# Patient Record
Sex: FEMALE | Race: OTHER | URBAN - METROPOLITAN AREA
[De-identification: names, ages, dates, MRNs, and addresses within clinical notes are randomized per-mention and may not be internally consistent; named-entity substitution may affect disease eponyms.]

---

## 2017-04-17 ENCOUNTER — OUTPATIENT (OUTPATIENT)
Dept: OUTPATIENT SERVICES | Facility: HOSPITAL | Age: 57
LOS: 1 days | Discharge: HOME | End: 2017-04-17

## 2017-05-02 ENCOUNTER — APPOINTMENT (OUTPATIENT)
Dept: BREAST CENTER | Facility: CLINIC | Age: 57
End: 2017-05-02

## 2017-05-02 VITALS
WEIGHT: 194 LBS | HEIGHT: 59 IN | BODY MASS INDEX: 39.11 KG/M2 | DIASTOLIC BLOOD PRESSURE: 78 MMHG | SYSTOLIC BLOOD PRESSURE: 134 MMHG

## 2017-05-02 DIAGNOSIS — K21.9 GASTRO-ESOPHAGEAL REFLUX DISEASE W/OUT ESOPHAGITIS: ICD-10-CM

## 2017-06-27 DIAGNOSIS — Z12.31 ENCOUNTER FOR SCREENING MAMMOGRAM FOR MALIGNANT NEOPLASM OF BREAST: ICD-10-CM

## 2017-10-04 ENCOUNTER — EMERGENCY (EMERGENCY)
Facility: HOSPITAL | Age: 57
LOS: 0 days | Discharge: HOME | End: 2017-10-04

## 2017-10-04 DIAGNOSIS — M25.561 PAIN IN RIGHT KNEE: ICD-10-CM

## 2017-12-18 ENCOUNTER — APPOINTMENT (OUTPATIENT)
Dept: HEMATOLOGY ONCOLOGY | Facility: CLINIC | Age: 57
End: 2017-12-18

## 2017-12-18 ENCOUNTER — OUTPATIENT (OUTPATIENT)
Dept: OUTPATIENT SERVICES | Facility: HOSPITAL | Age: 57
LOS: 1 days | Discharge: HOME | End: 2017-12-18

## 2017-12-18 VITALS
TEMPERATURE: 98.2 F | BODY MASS INDEX: 38.71 KG/M2 | DIASTOLIC BLOOD PRESSURE: 74 MMHG | HEART RATE: 60 BPM | HEIGHT: 59 IN | WEIGHT: 192 LBS | RESPIRATION RATE: 14 BRPM | SYSTOLIC BLOOD PRESSURE: 142 MMHG

## 2017-12-19 DIAGNOSIS — Z85.3 PERSONAL HISTORY OF MALIGNANT NEOPLASM OF BREAST: ICD-10-CM

## 2018-04-23 ENCOUNTER — OUTPATIENT (OUTPATIENT)
Dept: OUTPATIENT SERVICES | Facility: HOSPITAL | Age: 58
LOS: 1 days | Discharge: HOME | End: 2018-04-23

## 2018-04-23 DIAGNOSIS — Z12.31 ENCOUNTER FOR SCREENING MAMMOGRAM FOR MALIGNANT NEOPLASM OF BREAST: ICD-10-CM

## 2018-05-01 ENCOUNTER — APPOINTMENT (OUTPATIENT)
Dept: BREAST CENTER | Facility: CLINIC | Age: 58
End: 2018-05-01
Payer: MEDICAID

## 2018-05-01 VITALS
WEIGHT: 190 LBS | BODY MASS INDEX: 38.3 KG/M2 | HEIGHT: 59 IN | HEART RATE: 70 BPM | DIASTOLIC BLOOD PRESSURE: 90 MMHG | OXYGEN SATURATION: 98 % | SYSTOLIC BLOOD PRESSURE: 136 MMHG

## 2018-05-01 PROCEDURE — 99213 OFFICE O/P EST LOW 20 MIN: CPT

## 2018-06-16 ENCOUNTER — EMERGENCY (EMERGENCY)
Facility: HOSPITAL | Age: 58
LOS: 0 days | Discharge: HOME | End: 2018-06-17
Admitting: PHYSICIAN ASSISTANT

## 2018-06-16 VITALS
TEMPERATURE: 97 F | OXYGEN SATURATION: 97 % | SYSTOLIC BLOOD PRESSURE: 184 MMHG | DIASTOLIC BLOOD PRESSURE: 77 MMHG | HEART RATE: 63 BPM | RESPIRATION RATE: 20 BRPM

## 2018-06-16 DIAGNOSIS — S61.217A LACERATION WITHOUT FOREIGN BODY OF LEFT LITTLE FINGER WITHOUT DAMAGE TO NAIL, INITIAL ENCOUNTER: ICD-10-CM

## 2018-06-16 DIAGNOSIS — Z79.899 OTHER LONG TERM (CURRENT) DRUG THERAPY: ICD-10-CM
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
## 2018-06-16 DIAGNOSIS — I10 ESSENTIAL (PRIMARY) HYPERTENSION: ICD-10-CM
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
## 2018-06-16 DIAGNOSIS — Z79.82 LONG TERM (CURRENT) USE OF ASPIRIN: ICD-10-CM
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
## 2018-06-16 DIAGNOSIS — Z23 ENCOUNTER FOR IMMUNIZATION: ICD-10-CM

## 2018-06-16 RX ORDER — TETANUS TOXOID, REDUCED DIPHTHERIA TOXOID AND ACELLULAR PERTUSSIS VACCINE, ADSORBED 5; 2.5; 8; 8; 2.5 [IU]/.5ML; [IU]/.5ML; UG/.5ML; UG/.5ML; UG/.5ML
0.5 SUSPENSION INTRAMUSCULAR ONCE
Qty: 0 | Refills: 0 | Status: COMPLETED | OUTPATIENT
Start: 2018-06-16 | End: 2018-06-16

## 2018-06-16 NOTE — ED ADULT NURSE NOTE - OBJECTIVE STATEMENT
Patient reports cutting her finger with knife while making dinner resulting in a laceration to the dorsal aspect of left fifth finger.

## 2018-06-17 RX ORDER — LOSARTAN POTASSIUM 100 MG/1
0 TABLET, FILM COATED ORAL
Qty: 0 | Refills: 0 | COMMUNITY

## 2018-06-17 RX ORDER — ASPIRIN/CALCIUM CARB/MAGNESIUM 324 MG
0 TABLET ORAL
Qty: 0 | Refills: 0 | COMMUNITY

## 2018-06-17 RX ADMIN — TETANUS TOXOID, REDUCED DIPHTHERIA TOXOID AND ACELLULAR PERTUSSIS VACCINE, ADSORBED 0.5 MILLILITER(S): 5; 2.5; 8; 8; 2.5 SUSPENSION INTRAMUSCULAR at 00:09

## 2018-06-17 NOTE — ED PROVIDER NOTE - NS ED ROS FT
CONSTITUTIONAL: No weakness, fevers, or chills  RESPIRATORY:  No shortness of breath  CARDIOVASCULAR: No chest pain or palpitations  GASTROINTESTINAL: No nausea or vomiting  NEUROLOGICAL: No numbness or weakness or tingling to extremities. No dizziness.  SKIN: + laceration to left pinky

## 2018-06-17 NOTE — ED PROVIDER NOTE - DIAGNOSIS COUNSELING, MDM
conducted a detailed discussion... Patient to be discharged from ED. Verbal instructions given, including instructions to return to ED immediately for any new, worsening, or concerning symptoms. Patient endorsed understanding. Written discharge instructions additionally given, including follow-up plan.

## 2018-06-17 NOTE — ED PROVIDER NOTE - PHYSICAL EXAMINATION
GENERAL: NAD, well-developed  HEAD:  Atraumatic, Normocephalic  CHEST/LUNG: Clear to auscultation bilaterally; No wheeze, rhonchi, or rales  HEART: Regular rate and rhythm; No murmurs, rubs, or gallops  EXTREMITIES:  Radial pulses present B/L.  PSYCH: AAOx3, cooperative, appropriate  NEUROLOGY: AAOx3,  Strength 5/5 throughout. Sensation intact  MSK: Good tendon strength to phalanges of left hand.  SKIN: + 2 cm laceration at base of 5th MCP joint on dorsal side.  Minimal active bleeding. No signs of infection or FB. No tendon involvement

## 2018-06-17 NOTE — ED PROVIDER NOTE - OBJECTIVE STATEMENT
58 yo female with PMH of HTN presents to the Ed s/p laceration to left pinky caused by a knife while trying to chop ice x 1 hours ago. Patient denies fever, chills, other trauma, or numbness/tingling/weakness to extremities. Patient is unsure when last tetanus was received.

## 2018-12-17 ENCOUNTER — APPOINTMENT (OUTPATIENT)
Dept: HEMATOLOGY ONCOLOGY | Facility: CLINIC | Age: 58
End: 2018-12-17

## 2018-12-17 ENCOUNTER — OUTPATIENT (OUTPATIENT)
Dept: OUTPATIENT SERVICES | Facility: HOSPITAL | Age: 58
LOS: 1 days | Discharge: HOME | End: 2018-12-17

## 2018-12-17 VITALS
RESPIRATION RATE: 16 BRPM | TEMPERATURE: 96.6 F | HEIGHT: 59 IN | HEART RATE: 54 BPM | SYSTOLIC BLOOD PRESSURE: 136 MMHG | WEIGHT: 194 LBS | BODY MASS INDEX: 39.11 KG/M2 | DIASTOLIC BLOOD PRESSURE: 62 MMHG

## 2018-12-17 PROBLEM — I10 ESSENTIAL (PRIMARY) HYPERTENSION: Chronic | Status: ACTIVE | Noted: 2018-06-16

## 2018-12-18 DIAGNOSIS — Z85.3 PERSONAL HISTORY OF MALIGNANT NEOPLASM OF BREAST: ICD-10-CM

## 2018-12-21 NOTE — HISTORY OF PRESENT ILLNESS
[de-identified] : The patient returns for a followup visit.  The patient has a history of stage IA infiltrating ductal carcinoma of the right breast, status post lumpectomy, pT1a N0 M0, with extensive intraductal component.  The tumor was ER/[place] positive and HER2 negative.  She was treated with right breast lumpectomy and adjuvant radiation therapy, and since then, she has been on Arimidex.  She was started on Arimidex in 09/2011.  She denies any new symptoms.  She had a mammogram ithis year as noted below. She had DEXA Scan in 08/2016, which showed some osteopenia. \par Denies any new complaints. [de-identified] : Stage 1A Breast cancer [de-identified] : 12/17/18:\par Doing well. No major complaints.\par Denies fever, nausea, vomiting, chest pain, SOB, abdominal pain, bowel and bladder problems.\par Uptodate with mammo.

## 2018-12-21 NOTE — PHYSICAL EXAM
[Fully active, able to carry on all pre-disease performance without restriction] : Status 0 - Fully active, able to carry on all pre-disease performance without restriction [Normal] : grossly intact [Ulcers] : no ulcers [Mucositis] : no mucositis [Thrush] : no thrush [Vesicles] : no vesicles

## 2018-12-21 NOTE — ASSESSMENT
[FreeTextEntry1] : She has stage IA infiltrating ductal carcinoma of the right breast, ER/UT positive and HER2 negative, status post lumpectomy and adjuvant radiation therapy, and has been on hormonal therapy. \par Patient started on Hormonal therapy in 09/2011, and has completed 5 year of Adjuvant therapy.\par \par - Uptodate with mammo. Last one in 04/2018. Screening mammo prescription given.\par \par RTC in 1 year.\par Pt seen and examined with \par \par \par

## 2018-12-21 NOTE — RESULTS/DATA
[FreeTextEntry1] : EXAM:  MG MAMMO SCREEN BI         PROCEDURE DATE:  04/23/2018      INTERPRETATION:  HISTORY: Patient is 57 years old and is seen for screening.   The patient has a  history of right lumpectomy in 2012 - malignant.   The patient has the  following family history of breast cancer:  maternal aunt, at age 55,  breast cancer.  CLINICAL BREAST EXAM: The patient reports her last clinical breast exam was performed 11 months  ago.  FILMS COMPARED: The present examination has been compared to prior imaging studies  performed at [facility] on 04/08/2015, 04/11/2016  and 04/17/2017.  MAMMOGRAM FINDINGS: The following mammographic views were obtained: bilateral craniocaudal,  bilateral mediolateral oblique.  Computer-aided detection was utilized in  the interpretation of this examination.  There are scattered areas of fibroglandular density.  There is a stable area of architectural distortion at the site of  lumpectomy, consistent with postoperative fibrosis seen in the right  breast.  No suspicious masses, calcifications or other abnormalities are seen.  IMPRESSION: There is no evidence of malignancy.  RECOMMENDATION: Unless otherwise indicated by clinical findings, annual screening  mammography recommended.  ASSESSMENT: BI-RADS Category 2:  Benign

## 2018-12-21 NOTE — CONSULT LETTER
[Dear  ___] : Dear  [unfilled], [Courtesy Letter:] : I had the pleasure of seeing your patient, [unfilled], in my office today. [Please see my note below.] : Please see my note below. [Sincerely,] : Sincerely, [FreeTextEntry3] : Dr Gonzalez

## 2019-02-25 ENCOUNTER — OUTPATIENT (OUTPATIENT)
Dept: OUTPATIENT SERVICES | Facility: HOSPITAL | Age: 59
LOS: 1 days | Discharge: HOME | End: 2019-02-25

## 2019-02-25 DIAGNOSIS — Z13.220 ENCOUNTER FOR SCREENING FOR LIPOID DISORDERS: ICD-10-CM

## 2019-02-25 DIAGNOSIS — R79.89 OTHER SPECIFIED ABNORMAL FINDINGS OF BLOOD CHEMISTRY: ICD-10-CM

## 2019-02-25 DIAGNOSIS — R73.09 OTHER ABNORMAL GLUCOSE: ICD-10-CM

## 2019-02-25 DIAGNOSIS — Z00.00 ENCOUNTER FOR GENERAL ADULT MEDICAL EXAMINATION WITHOUT ABNORMAL FINDINGS: ICD-10-CM

## 2019-02-25 DIAGNOSIS — Z13.21 ENCOUNTER FOR SCREENING FOR NUTRITIONAL DISORDER: ICD-10-CM

## 2019-02-25 DIAGNOSIS — E11.9 TYPE 2 DIABETES MELLITUS WITHOUT COMPLICATIONS: ICD-10-CM

## 2019-02-25 DIAGNOSIS — N39.0 URINARY TRACT INFECTION, SITE NOT SPECIFIED: ICD-10-CM

## 2019-02-25 DIAGNOSIS — R80.9 PROTEINURIA, UNSPECIFIED: ICD-10-CM

## 2019-02-25 DIAGNOSIS — R79.1 ABNORMAL COAGULATION PROFILE: ICD-10-CM

## 2019-02-25 DIAGNOSIS — R97.0 ELEVATED CARCINOEMBRYONIC ANTIGEN [CEA]: ICD-10-CM

## 2019-02-25 DIAGNOSIS — R94.6 ABNORMAL RESULTS OF THYROID FUNCTION STUDIES: ICD-10-CM

## 2019-02-25 DIAGNOSIS — Z13.0 ENCOUNTER FOR SCREENING FOR DISEASES OF THE BLOOD AND BLOOD-FORMING ORGANS AND CERTAIN DISORDERS INVOLVING THE IMMUNE MECHANISM: ICD-10-CM

## 2019-04-28 ENCOUNTER — FORM ENCOUNTER (OUTPATIENT)
Age: 59
End: 2019-04-28

## 2019-04-29 ENCOUNTER — OUTPATIENT (OUTPATIENT)
Dept: OUTPATIENT SERVICES | Facility: HOSPITAL | Age: 59
LOS: 1 days | Discharge: HOME | End: 2019-04-29
Payer: MEDICAID

## 2019-04-29 DIAGNOSIS — Z12.31 ENCOUNTER FOR SCREENING MAMMOGRAM FOR MALIGNANT NEOPLASM OF BREAST: ICD-10-CM

## 2019-04-29 PROCEDURE — 77067 SCR MAMMO BI INCL CAD: CPT | Mod: 26

## 2019-05-07 ENCOUNTER — APPOINTMENT (OUTPATIENT)
Dept: BREAST CENTER | Facility: CLINIC | Age: 59
End: 2019-05-07
Payer: MEDICAID

## 2019-05-07 VITALS
BODY MASS INDEX: 39.11 KG/M2 | HEIGHT: 59 IN | DIASTOLIC BLOOD PRESSURE: 76 MMHG | SYSTOLIC BLOOD PRESSURE: 124 MMHG | WEIGHT: 194 LBS | TEMPERATURE: 98.6 F

## 2019-05-07 PROCEDURE — 99213 OFFICE O/P EST LOW 20 MIN: CPT

## 2019-05-08 NOTE — HISTORY OF PRESENT ILLNESS
[FreeTextEntry1] : Problem List:\par 1) Stage IA Right breast cancer. ER/RI (+). HER2 (-). (March 2011)\par 2) s/p Right NLOC - 04/20/11. s/p Right SNB - 06/06/11.\par 3) Dr. Gonzalez - Arimidex x 5 years. No chemo.\par 4) Dr. Quintero (+) RT.\par \par JUSTIN CEVALLOS is a 58 year old female patient who presents today in follow up for history of \par right breast cancer.\par Since her last visit, she has no new breast related complaints. \par Imaging was done on 04/29/19, which revealed no mammographic evidence of malignancy.\par \par She presents today for evaluation and imaging review.

## 2019-05-08 NOTE — REVIEW OF SYSTEMS
[Negative] : Constitutional [Nipple Discharge] : no nipple discharge [Breast Pain] : no breast pain [Breast Lump] : no breast lump [Nipple Inverted] : no inversion of the nipple

## 2019-05-08 NOTE — ASSESSMENT
[FreeTextEntry1] : JUSTIN is a rubi 58 year old patient who presented today in follow up for a history of right breast cancer. \par She has been doing well with no new breast related complaints. \par Imaging was done recently which revealed no evidence of malignancy, as detailed above. \par Physical exam was unrevealing today.\par \par Imaging with a bilateral screening mammogram will be due in April 2020, and that will be scheduled today. \par All questions answered. She knows to call with any concerns. \par She will return for follow-up and clinical breast exam in April 2020.\par She will continue follow-up with medical oncology as well. \par \par JUSTIN CEVALLOS has been enrolled in the breast cancer surgical survivorship care program.\par A copy of her survivorship care plan has been provided to her as well.

## 2019-05-08 NOTE — PHYSICAL EXAM
[Normocephalic] : normocephalic [Atraumatic] : atraumatic [No dominant masses] : no dominant masses in right breast  [No dominant masses] : no dominant masses left breast [No Nipple Discharge] : no left nipple discharge [Breast Mass Right Breast ___cm] : no masses [Breast Mass Left Breast ___cm] : no masses [No Rashes] : no rashes [No Ulceration] : no ulceration [Breast Nipple Inversion] : nipples not inverted [Breast Nipple Retraction] : nipples not retracted [de-identified] : well healed surgical scar.\par lumpectomy defect.  [de-identified] : No axillary lymphadenopathy appreciated. [de-identified] : No axillary lymphadenopathy appreciated.

## 2019-05-08 NOTE — PAST MEDICAL HISTORY
[Postmenopausal] : The patient is postmenopausal [Menarche Age ____] : age at menarche was [unfilled] [Total Preg ___] : G[unfilled] [Live Births ___] : P[unfilled]  [Age At Live Birth ___] : Age at live birth: [unfilled] [FreeTextEntry8] : Yes. [FreeTextEntry7] : Yes.

## 2019-05-08 NOTE — REASON FOR VISIT
[Follow-Up: _____] : a [unfilled] follow-up visit [FreeTextEntry1] : history of right breast cancer; imaging review.

## 2019-05-08 NOTE — DATA REVIEWED
[FreeTextEntry1] : B/L Screening Mammo - 04/29/19:\par MAMMOGRAM FINDINGS: \par Mammography was performed including the following views: bilateral \par craniocaudal with tomosynthesis, bilateral mediolateral oblique with \par tomosynthesis, bilateral craniocaudal, bilateral mediolateral oblique. The \par examination includes digital synthetic 2D and digital tomosynthesis 3D \par images. Additional imaging analysis was performed using CAD (computer-aided \par detection) software. \par There are scattered areas of fibroglandular density. \par Finding 1: There is a stable oval mass seen in the upper outer quadrant of \par the right breast. \par Finding 2: There are coarse popcorn-like calcifications seen in the right \par breast. \par This finding is most consistent with a calcified fibroadenoma. \par No suspicious mass, grouping of calcifications, or other abnormality is \par identified. \par There has been no significant interval change from prior studies. \par IMPRESSION: \par There is no mammographic evidence of malignancy. \par RECOMMENDATION: \par Unless otherwise indicated by clinical findings, annual screening \par mammography recommended. \par ASSESSMENT: \par BI-RADS Category 2: Benign

## 2019-12-16 ENCOUNTER — OUTPATIENT (OUTPATIENT)
Dept: OUTPATIENT SERVICES | Facility: HOSPITAL | Age: 59
LOS: 1 days | Discharge: HOME | End: 2019-12-16

## 2019-12-16 ENCOUNTER — APPOINTMENT (OUTPATIENT)
Dept: HEMATOLOGY ONCOLOGY | Facility: CLINIC | Age: 59
End: 2019-12-16
Payer: MEDICAID

## 2019-12-16 VITALS
HEIGHT: 59 IN | DIASTOLIC BLOOD PRESSURE: 72 MMHG | TEMPERATURE: 97.2 F | BODY MASS INDEX: 39.31 KG/M2 | WEIGHT: 195 LBS | HEART RATE: 57 BPM | SYSTOLIC BLOOD PRESSURE: 172 MMHG

## 2019-12-16 PROCEDURE — 99213 OFFICE O/P EST LOW 20 MIN: CPT

## 2019-12-16 NOTE — PHYSICAL EXAM
[Fully active, able to carry on all pre-disease performance without restriction] : Status 0 - Fully active, able to carry on all pre-disease performance without restriction [Normal] : grossly intact [Ulcers] : no ulcers [Thrush] : no thrush [Vesicles] : no vesicles [Mucositis] : no mucositis

## 2019-12-16 NOTE — HISTORY OF PRESENT ILLNESS
[de-identified] : The patient returns for a followup visit.  The patient has a history of stage IA infiltrating ductal carcinoma of the right breast, status post lumpectomy, pT1a N0 M0, with extensive intraductal component.  The tumor was ER/ND positive and HER2 negative.  She was treated with right breast lumpectomy and adjuvant radiation therapy, and since then, she has been on Arimidex.  She was started on Arimidex in 09/2011.  She denies any new symptoms.  She had a mammogram ithis year as noted below. She had DEXA Scan in 08/2016, which showed some osteopenia. \par Denies any new complaints. [de-identified] : Stage 1A Breast cancer [de-identified] : 12/17/18:\par Doing well. No major complaints.\par Denies fever, nausea, vomiting, chest pain, SOB, abdominal pain, bowel and bladder problems.\par Uptodate with mammo. \par \par 12/16/19\par  Patient here for follow up, feeling well.  She denies any new complaints.  Patient denies any new palpable breast lumps or pain, denies skin changes, denies nipple discharge.  Patient denies cough, shortness of breath, denies fever, denies bone pain.\par

## 2019-12-16 NOTE — ASSESSMENT
[FreeTextEntry1] : She has stage IA infiltrating ductal carcinoma of the right breast, ER/[place] positive and HER2 negative, status post lumpectomy and adjuvant radiation therapy, and has been on hormonal therapy. \par Patient started on Hormonal therapy in 09/2011, and has completed 5 year of Adjuvant therapy.\par \par - Uptodate with mammo. Last one in 04/2019. \par RTC in 1 year.\par \par

## 2019-12-16 NOTE — RESULTS/DATA
[FreeTextEntry1] : EXAM: MG MAMMO SCREEN BI \par PROCEDURE DATE: 04/29/2019 \par INTERPRETATION: HISTORY: \par Bilateral MG MAMMO SCREEN BI was performed. Patient is 58 years old and is seen for screening. The patient has a \par history of right lumpectomy in 2012 - malignant. The patient has the following family history of breast cancer: maternal \par aunt, at age 55, breast cancer. \par CLINICAL BREAST EXAM: \par The patient reports her last clinical breast exam was performed 7 months ago. \par COMPARISON STUDIES: \par The present examination has been compared to prior imaging studies performed at [facility] on \par 03/27/2014, 04/08/2015, 04/11/2016, 04/17/2017 and 04/23/2018. \par MAMMOGRAM FINDINGS: \par Mammography was performed including the following views: bilateral craniocaudal with tomosynthesis, bilateral \par mediolateral oblique with tomosynthesis, bilateral craniocaudal, bilateral mediolateral oblique. The examination includes \par digital synthetic 2D and digital tomosynthesis 3D images. Additional imaging analysis was performed using CAD \par (computer-aided detection) software. \par There are scattered areas of fibroglandular density. \par Finding 1: There is a stable oval mass seen in the upper outer quadrant of the right breast. \par Finding 2: There are coarse popcorn-like calcifications seen in the right breast. \par This finding is most consistent with a calcified fibroadenoma. \par No suspicious mass, grouping of calcifications, or other abnormality is identified. \par There has been no significant interval change from prior studies. \par IMPRESSION: \par There is no mammographic evidence of malignancy. \par RECOMMENDATION: \par Unless otherwise indicated by clinical findings, annual screening mammography recommended.

## 2019-12-23 DIAGNOSIS — C50.919 MALIGNANT NEOPLASM OF UNSPECIFIED SITE OF UNSPECIFIED FEMALE BREAST: ICD-10-CM

## 2020-05-12 ENCOUNTER — APPOINTMENT (OUTPATIENT)
Dept: BREAST CENTER | Facility: CLINIC | Age: 60
End: 2020-05-12

## 2020-07-15 ENCOUNTER — RESULT REVIEW (OUTPATIENT)
Age: 60
End: 2020-07-15

## 2020-07-15 ENCOUNTER — OUTPATIENT (OUTPATIENT)
Dept: OUTPATIENT SERVICES | Facility: HOSPITAL | Age: 60
LOS: 1 days | Discharge: HOME | End: 2020-07-15
Payer: COMMERCIAL

## 2020-07-15 DIAGNOSIS — Z12.31 ENCOUNTER FOR SCREENING MAMMOGRAM FOR MALIGNANT NEOPLASM OF BREAST: ICD-10-CM

## 2020-07-15 PROCEDURE — 77067 SCR MAMMO BI INCL CAD: CPT | Mod: 26

## 2020-07-15 PROCEDURE — 77063 BREAST TOMOSYNTHESIS BI: CPT | Mod: 26

## 2020-08-14 ENCOUNTER — APPOINTMENT (OUTPATIENT)
Dept: BREAST CENTER | Facility: CLINIC | Age: 60
End: 2020-08-14
Payer: COMMERCIAL

## 2020-08-14 VITALS
TEMPERATURE: 97.7 F | WEIGHT: 195 LBS | DIASTOLIC BLOOD PRESSURE: 80 MMHG | HEIGHT: 59 IN | BODY MASS INDEX: 39.31 KG/M2 | SYSTOLIC BLOOD PRESSURE: 136 MMHG

## 2020-08-14 PROCEDURE — 99213 OFFICE O/P EST LOW 20 MIN: CPT

## 2020-08-14 NOTE — PHYSICAL EXAM
[Normocephalic] : normocephalic [Atraumatic] : atraumatic [No dominant masses] : no dominant masses in right breast  [No dominant masses] : no dominant masses left breast [No Nipple Discharge] : no left nipple discharge [Breast Mass Right Breast ___cm] : no masses [Breast Mass Left Breast ___cm] : no masses [No Rashes] : no rashes [No Ulceration] : no ulceration [Breast Nipple Inversion] : nipples not inverted [de-identified] : well healed surgical scar.\par lumpectomy defect.  [Breast Nipple Retraction] : nipples not retracted [de-identified] : No axillary lymphadenopathy appreciated. [de-identified] : No axillary lymphadenopathy appreciated.

## 2020-08-14 NOTE — ASSESSMENT
[FreeTextEntry1] : JUSTIN is a rubi 59 year old patient who presented today in follow up for a history of right breast cancer. \par She has been doing well with no new breast related complaints. \par Imaging was done recently which revealed no evidence of malignancy, as detailed above. \par Physical exam was unrevealing today.\par \par Imaging with a bilateral screening mammogram will be due in July 2021, and that will be scheduled today. \par She will return for follow-up and clinical breast exam in July 2021.\par She will continue follow-up with medical oncology as well. \par \par JUSTIN CEVALLOS has been enrolled in the breast cancer surgical survivorship care program.\par A copy of her survivorship care plan has been provided to her as well.\par \par I spent a total of 15 minutes of face to face time with this patient, greater than 50% of which was spent in counseling and/or coordination of care.\par All of her questions were appropriately answered.\par She knows to call with any concerns.

## 2020-08-14 NOTE — HISTORY OF PRESENT ILLNESS
[FreeTextEntry1] : Problem List:\par 1) Stage IA Right breast cancer. ER/ND (+). HER2 (-). (March 2011)\par 2) s/p Right NLOC - 04/20/11. s/p Right SNB - 06/06/11.\par 3) Dr. Gonzalez - Arimidex x 5 years. No chemo.\par 4) Dr. Quintero (+) RT.\par \par JUSTIN CEVALLOS is a 59 year old female patient who presents today in follow up for history of \par right breast cancer.\par Since her last visit, she has no new breast related complaints. \par Imaging was done on 07/15/2020, which revealed no mammographic evidence of malignancy.\par \par She presents today for evaluation and imaging review.

## 2020-08-14 NOTE — DATA REVIEWED
[FreeTextEntry1] : B/L Screening Mammo -  07/15/2020:\par MAMMOGRAM FINDINGS:\par Mammography was performed including the following views: bilateral craniocaudal with tomosynthesis, bilateral mediolateral oblique with tomosynthesis.  The examination includes digital synthetic 2D and digital tomosynthesis 3D images. Additional imaging analysis was performed using CAD (computer-aided detection) software.\par There are scattered areas of fibroglandular density.\par Finding 1:  There is a stable oval mass seen in the right breast.\par Finding 2:  There are stable calcifications seen in both breasts.\par No suspicious mass, grouping of calcifications, or other abnormality is identified.\par IMPRESSION:\par There is no mammographic evidence of malignancy.\par RECOMMENDATION:\par Unless otherwise indicated by clinical findings, annual screening mammography recommended.\par ASSESSMENT:\par BI-RADS Category 2:  Benign\par

## 2020-08-14 NOTE — PAST MEDICAL HISTORY
[Postmenopausal] : The patient is postmenopausal [Menarche Age ____] : age at menarche was [unfilled] [Total Preg ___] : G[unfilled] [Live Births ___] : P[unfilled]  [Age At Live Birth ___] : Age at live birth: [unfilled] [FreeTextEntry7] : Yes. [FreeTextEntry8] : Yes.

## 2020-12-28 ENCOUNTER — OUTPATIENT (OUTPATIENT)
Dept: OUTPATIENT SERVICES | Facility: HOSPITAL | Age: 60
LOS: 1 days | Discharge: HOME | End: 2020-12-28

## 2020-12-28 ENCOUNTER — APPOINTMENT (OUTPATIENT)
Dept: HEMATOLOGY ONCOLOGY | Facility: CLINIC | Age: 60
End: 2020-12-28
Payer: MEDICAID

## 2020-12-28 VITALS
SYSTOLIC BLOOD PRESSURE: 166 MMHG | DIASTOLIC BLOOD PRESSURE: 68 MMHG | WEIGHT: 202 LBS | HEIGHT: 59 IN | BODY MASS INDEX: 40.72 KG/M2 | TEMPERATURE: 99.1 F | HEART RATE: 59 BPM

## 2020-12-28 PROCEDURE — 99213 OFFICE O/P EST LOW 20 MIN: CPT

## 2020-12-29 NOTE — RESULTS/DATA
[FreeTextEntry1] : \par  Mammogram Digital Screening, Bilat             Final\par EXAM:  MG MAMMO SCREEN W ELSIE BI#\par \par \par PROCEDURE DATE:  07/15/2020\par \par \par \par INTERPRETATION:  HISTORY:\par Bilateral MG MAMMO SCREEN W ELSIE BI# was performed. Patient is 59 years old and is seen for screening.  The patient has a history of right lumpectomy in 2012 - malignant. The patient has the following family history of breast cancer:  maternal aunt, at age 55, breast cancer.\par \par RISK ASSESSMENT:\par [place] Lifetime Risk: 7.8\par \par CLINICAL BREAST EXAM:\par The patient reports her last clinical breast exam was performed over one year ago.\par \par COMPARISON STUDIES:\par The present examination has been compared to prior imaging studies performed at [facility] on 04/11/2016, 04/17/2017, 04/23/2018 and 04/29/2019.\par \par MAMMOGRAM FINDINGS:\par Mammography was performed including the following views: bilateral craniocaudal with tomosynthesis, bilateral mediolateral oblique with tomosynthesis.  The examination includes digital synthetic 2D and digital tomosynthesis 3D images. Additional imaging analysis was performed using CAD (computer-aided detection) software.\par \par There are scattered areas of fibroglandular density.\par \par Finding 1:  There is a stable oval mass seen in the right breast.\par \par Finding 2:  There are stable calcifications seen in both breasts.\par \par No suspicious mass, grouping of calcifications, or other abnormality is identified.\par \par IMPRESSION:\par There is no mammographic evidence of malignancy.\par \par RECOMMENDATION:\par Unless otherwise indicated by clinical findings, annual screening mammography recommended.\par \par ASSESSMENT:\par BI-RADS Category 2:  Benign\par \par The patient will be notified of these results by telephone, and will also be mailed a written summary in layman's terms.\par \par

## 2020-12-29 NOTE — HISTORY OF PRESENT ILLNESS
[de-identified] : The patient returns for a followup visit.  The patient has a history of stage IA infiltrating ductal carcinoma of the right breast, status post lumpectomy, pT1a N0 M0, with extensive intraductal component.  The tumor was ER/NM positive and HER2 negative.  She was treated with right breast lumpectomy and adjuvant radiation therapy, and since then, she has been on Arimidex.  She was started on Arimidex in 09/2011.  She denies any new symptoms.  She had a mammogram ithis year as noted below. She had DEXA Scan in 08/2016, which showed some osteopenia. \par Denies any new complaints. [de-identified] : Stage 1A Breast cancer [de-identified] : 12/17/18:\par Doing well. No major complaints.\par Denies fever, nausea, vomiting, chest pain, SOB, abdominal pain, bowel and bladder problems.\par Uptodate with mammo. \par \par 12/16/19\par  Patient here for follow up, feeling well.  She denies any new complaints.  Patient denies any new palpable breast lumps or pain, denies skin changes, denies nipple discharge.  Patient denies cough, shortness of breath, denies fever, denies bone pain.\par \par 12/28/2020\par JUSTIN CEVALLOS a 60 year F is here today for annual follow up for breast cancer. Patient denies any new palpable breast lumps or pain, denies skin changes, denies nipple discharge. Offers no complaints. \par \par

## 2020-12-29 NOTE — ASSESSMENT
[FreeTextEntry1] : She has stage IA infiltrating ductal carcinoma of the right breast, ER/CT positive and HER2 negative, status post lumpectomy and adjuvant radiation therapy, and has been on hormonal therapy. \par Patient started on Hormonal therapy in 09/2011, and has completed 5 year of Adjuvant therapy.\par \par - Uptodate with mammo. Last one in 07/2020. \par \par \par RTC in 1 year.\par \par Case was seen and discussed with Dr. Gonzalez who agreed with the assessment and plan.\par

## 2021-01-04 DIAGNOSIS — C50.919 MALIGNANT NEOPLASM OF UNSPECIFIED SITE OF UNSPECIFIED FEMALE BREAST: ICD-10-CM

## 2021-02-19 ENCOUNTER — EMERGENCY (EMERGENCY)
Facility: HOSPITAL | Age: 61
LOS: 0 days | Discharge: HOME | End: 2021-02-19
Attending: EMERGENCY MEDICINE | Admitting: EMERGENCY MEDICINE
Payer: MEDICAID

## 2021-02-19 VITALS
TEMPERATURE: 208 F | OXYGEN SATURATION: 96 % | HEART RATE: 76 BPM | RESPIRATION RATE: 18 BRPM | DIASTOLIC BLOOD PRESSURE: 79 MMHG | SYSTOLIC BLOOD PRESSURE: 186 MMHG

## 2021-02-19 VITALS — TEMPERATURE: 98 F
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
## 2021-02-19 DIAGNOSIS — S63.92XA SPRAIN OF UNSPECIFIED PART OF LEFT WRIST AND HAND, INITIAL ENCOUNTER: ICD-10-CM

## 2021-02-19 DIAGNOSIS — M25.539 PAIN IN UNSPECIFIED WRIST: ICD-10-CM

## 2021-02-19 PROCEDURE — 29125 APPL SHORT ARM SPLINT STATIC: CPT

## 2021-02-19 PROCEDURE — 73090 X-RAY EXAM OF FOREARM: CPT | Mod: 26,LT

## 2021-02-19 PROCEDURE — 99284 EMERGENCY DEPT VISIT MOD MDM: CPT | Mod: 25

## 2021-02-19 PROCEDURE — 73110 X-RAY EXAM OF WRIST: CPT | Mod: 26,LT

## 2021-02-19 PROCEDURE — 73080 X-RAY EXAM OF ELBOW: CPT | Mod: 26,LT

## 2021-02-19 RX ORDER — IBUPROFEN 200 MG
1 TABLET ORAL
Qty: 40 | Refills: 0
Start: 2021-02-19 | End: 2021-02-28

## 2021-02-19 NOTE — ED PROVIDER NOTE - NSFOLLOWUPINSTRUCTIONS_ED_ALL_ED_FT
[facility] - After[facility](R) Instructions(ER/ED)           Esguince de zac    LO QUE NECESITA SABER:    Un esguince de zac se produce cuando nya o más ligamentos en curran zac se estiran o desgarran. Los ligamentos son tejidos alexus que conectan los huesos entre sí y los mantienen en curran lugar, y sostienen patti articulaciones.    INSTRUCCIONES SOBRE EL VIKRAM HOSPITALARIA:    Regrese a la ashley de emergencias si:  •Usted tiene dolor o inflamación severos.      •La zac lesionada está enrojecida o tiene jabari hayward que se extienden desde el área lesionada.      •Le surge dificultad para  y usar las aspen, los dedos o la zac.      •Curran brazo, mano o dedos están entumecidos.      •Patti labios o uñas de las aspen se tornan grises o azules.      Llame a curran médico si:  •Patti síntomas empeoran.      •Usted tiene dolor e inflamación por más de 48 horas.      •Usted tiene preguntas o inquietudes acerca de curran condición o cuidado.      Medicamentos:Es posible que usted necesite alguno de los siguientes:   •Los LLOYD,jus el ibuprofeno, ayudan a disminuir la inflamación, el dolor y la fiebre. Los LLOYD pueden causar sangrado estomacal o problemas renales en ciertas personas. Si usted jade un medicamento anticoagulante, siempre pregúntele a curran médico si los LLOYD son seguros para usted. Siempre victoria la etiqueta de janak medicamento y siga las instrucciones.      •Acetaminofénalivia el dolor y baja la fiebre. Está disponible sin receta médica. Pregunte la cantidad y la frecuencia con que debe tomarlos. Siga las indicaciones. Victoria las etiquetas de todos los demás medicamentos que esté usando para saber si también contienen acetaminofén, o pregunte a curran médico o farmacéutico. El acetaminofén puede causar daño en el hígado cuando no se jade de forma correcta. No use más de 4 gramos (4000 miligramos) en total de acetaminofeno en un día.      •[place] patti medicamentos jus se le haya indicado.Consulte con curran médico si usted corrine que curran medicamento no le está ayudando o si presenta efectos secundarios. Infórmele si es alérgico a cualquier medicamento. Mantenga dong lista actualizada de los medicamentos, las vitaminas y los productos herbales que jade. Incluya los siguientes datos de los medicamentos: cantidad, frecuencia y motivo de administración. Traiga con usted la lista o los envases de las píldoras a patti citas de seguimiento. Lleve la lista de los medicamentos con usted en geovani de dong emergencia.      Cuidados personales:  •Descansesu zac por lo menos por 48 horas. Evite actividades que le causen dolor.      •El hielosu zac de 15 a 20 minutos cada hora o jus se le indique. Use dong compresa de hielo o ponga hielo triturado en dong bolsa de plástico. Envuelva el hielo con dong toalla antes de colocarlo sobre la zac. El hielo ayuda a evitar daño al tejido y a disminuir la inflamación y el dolor.      •Compresiónsu zca con dong venda elástica. [place] ayudará a bajar la inflamación, blane apoyo a la zac y contribuir a que sane. Use curran muñequera jus se le indique. El vendaje elástico debe estar ajustado raven no apretado.  Cómo colocar dong venda elástica           •Elevesu zac por encima del nivel de curran corazón con la mayor frecuencia posible. [place] va a disminuir inflamación y el dolor. Apoye curran zac sobre almohadas o mantas para mantenerla elevada cómodamente.             Soporte para la zac:Es posible que necesite usar dong férula o un yeso para apoyar curran zac y evitar más daño. Use curran férula jus se le indique. Pida instrucciones sobre cómo bañarse mientras que usted está usando dong férula o yeso.    Fisioterapia:Curran médico podría recomendar que usted vaya a fisioterapia. Un fisioterapeuta le puede enseñar ejercicios para ayudarle a mejorar el movimiento y la fuerza, y para disminuir el dolor.    Acuda a la consulta de control con curran médico según las indicaciones:Anote patti preguntas para que se acuerde de hacerlas kirill patti visitas.

## 2021-02-19 NOTE — ED ADULT NURSE NOTE - OBJECTIVE STATEMENT
pt c/o left wrist pain  s/p slip and fall on ice outside of her house . denies loc. denies ac. denies head trauma

## 2021-02-19 NOTE — ED PROVIDER NOTE - PATIENT PORTAL LINK FT
You can access the FollowMyHealth Patient Portal offered by [facility] by registering at the following website: [email or web address]. By joining [employer]’s FollowMyHealth portal, you will also be able to view your health information using other applications (apps) compatible with our system.

## 2021-02-19 NOTE — ED PROVIDER NOTE - PHYSICAL EXAMINATION
GEN: Patient in no acute distress  MS: + tenderness to left distal radius, Normal ROM in all extremities. No midline spinal tenderness. pulses 2 +.  SKIN: Warm, dry, no acute rashes. Good turgor  NEURO: Strength 5/5 with no sensory deficits. Steady gait.

## 2021-02-19 NOTE — ED PROVIDER NOTE - ATTENDING CONTRIBUTION TO CARE
61yo F with no sig PMHx presents for eval s/p slip and fall on ice. Pt fell onto left forearm, c/o pain to ulnar aspect of wrist and distal forearm. Denies other pain or injury. Denies head trauma or LOC. Denies numbness, tingling, weakness.     Vital signs reviewed  GENERAL: Patient nontoxic appearing, NAD  HEAD: NCAT. No signs of basilar skull fx  EYES: Anicteric  ENT: MMM  NECK: No midline TTP. FROM  RESPIRATORY: Normal respiratory effort. CTA B/L. No wheezing, rales, rhonchi  CARDIOVASCULAR: Regular rate and rhythm  ABDOMEN: Soft. Nondistended. Nontender.   MUSCULOSKELETAL/EXTREMITIES: Brisk cap refill. 2+ radial pulses. Mild TTP to ulnar aspect of left wrist, no swelling or gross deformity, full ROM of wrist. Full ROM of digits, elbow, shoulder without swelling or gross deformity. No midline back TTP.   NEURO: AAOx3. No gross FND.

## 2021-02-19 NOTE — ED PEDIATRIC TRIAGE NOTE - CHIEF COMPLAINT QUOTE
PT slipped on ice in front of house and landed on left arm. Complaining of pain from left wrist to left elbow

## 2021-02-19 NOTE — ED PROVIDER NOTE - OBJECTIVE STATEMENT
60 year old female with no pmhx presents s/p fall. Pt tripped and fell on left wrist, complaining of left wrist pain. no head injury. no neck or back pain.

## 2021-02-19 NOTE — ED PROVIDER NOTE - CLINICAL SUMMARY MEDICAL DECISION MAKING FREE TEXT BOX
61yo F with no sig PMHx presents for eval s/p slip and fall on ice. Pain most significantly to left wrist, xray wrist without evidence of acute fx/ dislocation, will splint and treat as sprain. Xray elbow without sail sign/ posterior fat pad. F/u ortho. Return precautions given.

## 2021-02-19 NOTE — ED PROVIDER NOTE - NS ED ROS FT
Review of Systems:  	•	CONSTITUTIONAL - no fever, no diaphoresis, no chills  	•	SKIN - no rash  	•	MUSCULOSKELETAL - + left wrist pain  	•	NEUROLOGIC - no weakness, no headache, no paresthesias, no LOC

## 2021-02-19 NOTE — ED PROVIDER NOTE - NSFOLLOWUPCLINICS_GEN_ALL_ED_FT
[facility]  Orthpedic  [address], [place]   Phone: [phone]  Fax:   Follow Up Time:

## 2021-07-19 ENCOUNTER — NON-APPOINTMENT (OUTPATIENT)
Age: 61
End: 2021-07-19

## 2021-07-19 ENCOUNTER — RESULT REVIEW (OUTPATIENT)
Age: 61
End: 2021-07-19

## 2021-07-19 ENCOUNTER — OUTPATIENT (OUTPATIENT)
Dept: OUTPATIENT SERVICES | Facility: HOSPITAL | Age: 61
LOS: 1 days | Discharge: HOME | End: 2021-07-19
Payer: MEDICAID

## 2021-07-19 DIAGNOSIS — Z12.31 ENCOUNTER FOR SCREENING MAMMOGRAM FOR MALIGNANT NEOPLASM OF BREAST: ICD-10-CM

## 2021-07-19 PROCEDURE — 77067 SCR MAMMO BI INCL CAD: CPT | Mod: 26

## 2021-07-19 PROCEDURE — 77063 BREAST TOMOSYNTHESIS BI: CPT | Mod: 26

## 2021-08-06 ENCOUNTER — APPOINTMENT (OUTPATIENT)
Dept: BREAST CENTER | Facility: CLINIC | Age: 61
End: 2021-08-06
Payer: COMMERCIAL

## 2021-08-06 VITALS
TEMPERATURE: 97.2 F | WEIGHT: 202 LBS | DIASTOLIC BLOOD PRESSURE: 80 MMHG | BODY MASS INDEX: 40.72 KG/M2 | SYSTOLIC BLOOD PRESSURE: 126 MMHG | HEIGHT: 59 IN

## 2021-08-06 PROCEDURE — 99213 OFFICE O/P EST LOW 20 MIN: CPT

## 2021-08-06 NOTE — PHYSICAL EXAM
[Normocephalic] : normocephalic [Atraumatic] : atraumatic [No Supraclavicular Adenopathy] : no supraclavicular adenopathy [No dominant masses] : no dominant masses in right breast  [No dominant masses] : no dominant masses left breast [No Nipple Discharge] : no left nipple discharge [Breast Mass Right Breast ___cm] : no masses [Breast Mass Left Breast ___cm] : no masses [No Rashes] : no rashes [No Ulceration] : no ulceration [Breast Nipple Inversion] : nipples not inverted [Breast Nipple Retraction] : nipples not retracted [de-identified] : well healed surgical scar.\par lumpectomy defect.  [de-identified] : No axillary lymphadenopathy appreciated. [de-identified] : No axillary lymphadenopathy appreciated.

## 2021-08-06 NOTE — HISTORY OF PRESENT ILLNESS
[FreeTextEntry1] : Problem List:\par 1) Stage IA Right breast cancer. ER/[place] (+). HER2 (-). (March 2011)\par 2) s/p Right NLOC - 04/20/11. s/p Right SNB - 06/06/11.\par 3) Dr. Gonzalez - Arimidex x 5 years. No chemo.\par 4) Dr. Quintero (+) RT.\par \par JUSTIN CEVALLOS is a 60 year old female patient who presents today in follow up for history of \par right breast cancer.\par Since her last visit, she has no new breast related complaints. \par Imaging was done on 07/19/2021, which revealed no mammographic evidence of malignancy.\par \par She presents today for evaluation and imaging review.

## 2021-08-06 NOTE — ASSESSMENT
[FreeTextEntry1] : JUSTIN is a rubi 60 year old patient who presented today in follow up for a history of right breast cancer. \par She has been doing well with no new breast related complaints. \par Imaging was done recently which revealed no evidence of malignancy, as detailed above. \par Physical exam was unrevealing today.\par \par Imaging with a bilateral screening mammogram will be due in July 2022, and that will be scheduled today. \par She will return for follow-up and clinical breast exam in July 2022.\par She will continue follow-up with medical oncology as well. \par \par JUSTIN CEVALLOS has been enrolled in the breast cancer surgical survivorship care program.\par A copy of her survivorship care plan has been provided to her as well.\par \par The patient was informed that Dr. Sasha Gamboa will no longer be practicing here as of the end of August 2021; her care will be continued with the practice.\par \par I spent a total of 20 minutes of face to face time with this patient, greater than 50% of which was spent in counseling and/or coordination of care.\par All of her questions were appropriately answered.\par She knows to call with any concerns.

## 2021-08-06 NOTE — DATA REVIEWED
[FreeTextEntry1] : B/L Screening Mammo -  07/19/2021:\par MAMMOGRAM FINDINGS:\par Mammography was performed including the following views: bilateral craniocaudal with tomosynthesis, bilateral mediolateral oblique with tomosynthesis.  The examination includes digital synthetic 2D and digital tomosynthesis 3D images. Additional imaging analysis was performed using CAD (computer-aided detection) software.\par \par There are scattered areas of fibroglandular density.\par \par Finding 1:  There is a stable benign mass seen in the right breast.\par \par Finding 2:  There are stable benign-type calcifications seen in the right breast.\par \par No suspicious mass, grouping of calcifications, or other abnormality is identified.\par \par There has been no significant change from prior examinations.\par \par IMPRESSION:\par There is no mammographic evidence of malignancy.\par \par RECOMMENDATION:\par Unless otherwise indicated by clinical findings, annual screening mammography recommended.\par \par ASSESSMENT:\par BI-RADS Category 2:  Benign

## 2021-12-20 ENCOUNTER — APPOINTMENT (OUTPATIENT)
Dept: HEMATOLOGY ONCOLOGY | Facility: CLINIC | Age: 61
End: 2021-12-20
Payer: MEDICAID

## 2021-12-20 ENCOUNTER — OUTPATIENT (OUTPATIENT)
Dept: OUTPATIENT SERVICES | Facility: HOSPITAL | Age: 61
LOS: 1 days | Discharge: HOME | End: 2021-12-20

## 2021-12-20 VITALS
TEMPERATURE: 98.6 F | BODY MASS INDEX: 40.72 KG/M2 | SYSTOLIC BLOOD PRESSURE: 143 MMHG | DIASTOLIC BLOOD PRESSURE: 83 MMHG | HEIGHT: 59 IN | HEART RATE: 107 BPM | WEIGHT: 202 LBS

## 2021-12-20 DIAGNOSIS — C50.919 MALIGNANT NEOPLASM OF UNSPECIFIED SITE OF UNSPECIFIED FEMALE BREAST: ICD-10-CM

## 2021-12-20 PROCEDURE — 99213 OFFICE O/P EST LOW 20 MIN: CPT

## 2021-12-20 NOTE — ASSESSMENT
[FreeTextEntry1] : She has stage IA infiltrating ductal carcinoma of the right breast, ER/[place] positive and HER2 negative, status post lumpectomy and adjuvant radiation therapy, and had been on hormonal therapy. \par Patient started on Hormonal therapy in 09/2011, and has completed 5 year of Adjuvant therapy.\par \par - Uptodate with mammo. Last one in 07/2021 BIRADS 2. \par \par \par RTC in 1 year.\par \par

## 2021-12-20 NOTE — HISTORY OF PRESENT ILLNESS
[de-identified] : The patient returns for a followup visit.  The patient has a history of stage IA infiltrating ductal carcinoma of the right breast, status post lumpectomy, pT1a N0 M0, with extensive intraductal component.  The tumor was ER/IA positive and HER2 negative.  She was treated with right breast lumpectomy and adjuvant radiation therapy, and since then, she has been on Arimidex.  She was started on Arimidex in 09/2011.  She denies any new symptoms.  She had a mammogram ithis year as noted below. She had DEXA Scan in 08/2016, which showed some osteopenia. \par Denies any new complaints. [de-identified] : Stage 1A Breast cancer [de-identified] : 12/17/18:\par Doing well. No major complaints.\par Denies fever, nausea, vomiting, chest pain, SOB, abdominal pain, bowel and bladder problems.\par Uptodate with mammo. \par \par 12/16/19\par  Patient here for follow up, feeling well.  She denies any new complaints.  Patient denies any new palpable breast lumps or pain, denies skin changes, denies nipple discharge.  Patient denies cough, shortness of breath, denies fever, denies bone pain.\par \par 12/28/2020\par JUSTIN CEVALLOS a 60 year F is here today for annual follow up for breast cancer. Patient denies any new palpable breast lumps or pain, denies skin changes, denies nipple discharge. Offers no complaints. \par \par 12/20/21\par  Patient here for follow up, feeling well.  She denies any new complaints.  Patient denies any new palpable breast lumps or pain, denies skin changes, denies nipple discharge.  Patient denies cough, shortness of breath, denies fever, denies bone pain.\par \par \par

## 2021-12-27 DIAGNOSIS — C50.919 MALIGNANT NEOPLASM OF UNSPECIFIED SITE OF UNSPECIFIED FEMALE BREAST: ICD-10-CM

## 2022-04-28 NOTE — ED PROVIDER NOTE - NS_EDPROVIDERDISPOUSERTYPE_ED_A_ED
I have personally evaluated and examined the patient. The Attending was available to me as a supervising provider if needed. hair removal not indicated

## 2022-07-18 ENCOUNTER — RESULT REVIEW (OUTPATIENT)
Age: 62
End: 2022-07-18

## 2022-07-18 ENCOUNTER — OUTPATIENT (OUTPATIENT)
Dept: OUTPATIENT SERVICES | Facility: HOSPITAL | Age: 62
LOS: 1 days | Discharge: HOME | End: 2022-07-18

## 2022-07-18 DIAGNOSIS — Z12.31 ENCOUNTER FOR SCREENING MAMMOGRAM FOR MALIGNANT NEOPLASM OF BREAST: ICD-10-CM

## 2022-07-18 PROCEDURE — 77067 SCR MAMMO BI INCL CAD: CPT | Mod: 26

## 2022-07-18 PROCEDURE — 77063 BREAST TOMOSYNTHESIS BI: CPT | Mod: 26

## 2022-08-05 ENCOUNTER — APPOINTMENT (OUTPATIENT)
Dept: BREAST CENTER | Facility: CLINIC | Age: 62
End: 2022-08-05

## 2022-08-05 VITALS
HEIGHT: 59 IN | HEART RATE: 61 BPM | BODY MASS INDEX: 40.72 KG/M2 | DIASTOLIC BLOOD PRESSURE: 95 MMHG | WEIGHT: 202 LBS | SYSTOLIC BLOOD PRESSURE: 168 MMHG

## 2022-08-05 DIAGNOSIS — C50.911 MALIGNANT NEOPLASM OF UNSPECIFIED SITE OF RIGHT FEMALE BREAST: ICD-10-CM

## 2022-08-05 PROCEDURE — 99213 OFFICE O/P EST LOW 20 MIN: CPT

## 2022-08-05 NOTE — PHYSICAL EXAM
[Normocephalic] : normocephalic [Atraumatic] : atraumatic [No Supraclavicular Adenopathy] : no supraclavicular adenopathy [No dominant masses] : no dominant masses in right breast  [No dominant masses] : no dominant masses left breast [No Nipple Discharge] : no left nipple discharge [Breast Mass Right Breast ___cm] : no masses [Breast Mass Left Breast ___cm] : no masses [No Rashes] : no rashes [No Ulceration] : no ulceration [Breast Nipple Inversion] : nipples not inverted [Breast Nipple Retraction] : nipples not retracted [de-identified] : well healed surgical scar.\par lumpectomy defect.  [de-identified] : No axillary lymphadenopathy appreciated. [de-identified] : No axillary lymphadenopathy appreciated.

## 2022-08-05 NOTE — ASSESSMENT
[FreeTextEntry1] : JUSTIN is a rubi 61 year old patient who presented today in follow up for a history of right breast cancer. \par She has been doing well with no new breast related complaints. \par Imaging with a bilateral screening mammogram was done on 07/18/2022, which revealed no mammographic evidence of malignancy.\par BI-RADS Category 2:  Benign, as detailed above. \par \par Physical exam was unrevealing today.\par \par Imaging with a bilateral screening mammogram will be due in July 2023, and that will be scheduled today. \par She will return for follow-up and clinical breast exam in July 2023.\par She will continue follow-up with medical oncology as scheduled. \par \par JUSTIN CEVALLOS has been enrolled in the breast cancer surgical survivorship care program.\par A copy of her survivorship care plan has been provided to her as well.\par \par \par I spent a total of 20 minutes of face to face time with this patient, greater than 50% of which was spent in counseling and/or coordination of care.\par All of her questions were appropriately answered.\par She knows to call with any concerns.

## 2022-08-05 NOTE — HISTORY OF PRESENT ILLNESS
[FreeTextEntry1] : Problem List:\par 1) Stage IA Right breast cancer. ER/[place] (+). HER2 (-). (March 2011)\par 2) s/p Right NLOC - 04/20/11. s/p Right SNB - 06/06/11.\par 3) Dr. Gonzalez - Arimidex x 5 years. No chemo.\par 4) Dr. Quintero (+) RT.\par \par JUSTIN CEVALLOS is a 61 year old female patient who presents today in follow up for history of \par right breast cancer.\par Since her last visit, she has no new breast related complaints. \par Imaging with a bilateral screening mammogram was done on 07/18/2022, which revealed no mammographic evidence of malignancy.\par BI-RADS Category 2:  Benign\par \par She presents today for evaluation and imaging review.

## 2022-08-05 NOTE — DATA REVIEWED
[FreeTextEntry1] : B/L Screening Mammo -  07/18/2022:\par MAMMOGRAM FINDINGS:\par Mammography was performed including the following views: bilateral craniocaudal with tomosynthesis, bilateral mediolateral oblique with tomosynthesis.  The examination includes digital synthetic 2D and digital tomosynthesis 3D images. Additional imaging analysis was performed using CAD (computer-aided detection) software.\par \par There are scattered areas of fibroglandular density.\par \par There is a stable mass seen in the right breast.\par \par No suspicious mass, grouping of calcifications, or other abnormality is identified.\par \par IMPRESSION:\par There is no mammographic evidence of malignancy.\par \par RECOMMENDATION:\par Unless otherwise indicated by clinical findings, annual screening mammography recommended.\par \par ASSESSMENT:\par BI-RADS Category 2:  Benign

## 2023-07-24 ENCOUNTER — OUTPATIENT (OUTPATIENT)
Dept: OUTPATIENT SERVICES | Facility: HOSPITAL | Age: 63
LOS: 1 days | End: 2023-07-24
Payer: MEDICAID

## 2023-07-24 ENCOUNTER — RESULT REVIEW (OUTPATIENT)
Age: 63
End: 2023-07-24

## 2023-07-24 DIAGNOSIS — Z12.31 ENCOUNTER FOR SCREENING MAMMOGRAM FOR MALIGNANT NEOPLASM OF BREAST: ICD-10-CM

## 2023-07-24 DIAGNOSIS — Z00.8 ENCOUNTER FOR OTHER GENERAL EXAMINATION: ICD-10-CM

## 2023-07-24 PROCEDURE — 77063 BREAST TOMOSYNTHESIS BI: CPT | Mod: 26

## 2023-07-24 PROCEDURE — 77067 SCR MAMMO BI INCL CAD: CPT

## 2023-07-24 PROCEDURE — 77067 SCR MAMMO BI INCL CAD: CPT | Mod: 26

## 2023-07-24 PROCEDURE — 77063 BREAST TOMOSYNTHESIS BI: CPT

## 2023-07-25 DIAGNOSIS — Z12.31 ENCOUNTER FOR SCREENING MAMMOGRAM FOR MALIGNANT NEOPLASM OF BREAST: ICD-10-CM

## 2023-07-31 ENCOUNTER — APPOINTMENT (OUTPATIENT)
Dept: BREAST CENTER | Facility: CLINIC | Age: 63
End: 2023-07-31
Payer: MEDICAID

## 2023-07-31 PROCEDURE — 99212 OFFICE O/P EST SF 10 MIN: CPT

## 2023-07-31 NOTE — DATA REVIEWED
[FreeTextEntry1] : ACC: [id number]     EXAM:  MG MAMMO SCREEN W ELSIE BI#   ORDERED BY: CONSTANCE CEDENO\par \par PROCEDURE DATE:  07/24/2023\par \par \par \par INTERPRETATION:  HISTORY:\par Bilateral MG MAMMO SCREEN W ELSIE BI# was performed. Patient is 62 years old and is seen for screening.  The patient has a history of right lumpectomy in 2012 - malignant. The patient has the following family history of breast cancer:  maternal aunt, at age 55, breast cancer.\par \par RISK ASSESSMENT:\par [place] Lifetime Risk: 5.7\par \par CLINICAL BREAST EXAM:\par The patient reports their last clinical breast exam was performed within the past year.\par \par COMPARISON STUDIES:\par The present examination has been compared to prior imaging studies performed at [facility] on 04/17/2017, 04/23/2018, 04/29/2019, 07/15/2020, 07/19/2021 and 07/18/2022.\par \par MAMMOGRAM FINDINGS:\par Mammography was performed including the following views: bilateral craniocaudal with tomosynthesis, bilateral mediolateral oblique with tomosynthesis.  The examination includes digital synthetic 2D and digital tomosynthesis 3D images. Additional imaging analysis was performed using CAD (computer-aided detection) software.\par \par There are scattered areas of fibroglandular density.\par \par There is a stable mass seen in the right breast.\par \par No suspicious mass, grouping of calcifications, or other abnormality is identified.\par \par IMPRESSION:\par There is no mammographic evidence of malignancy.\par \par RECOMMENDATION:\par Unless otherwise indicated by clinical findings, annual screening mammography recommended.\par \par ASSESSMENT:\par BI-RADS Category 2:  Benign\par

## 2023-07-31 NOTE — HISTORY OF PRESENT ILLNESS
[FreeTextEntry1] : Problem List: 1) Stage IA Right breast cancer. ER/[place] (+). HER2 (-). (March 2011) 2) s/p Right NLOC - 04/20/11. s/p Right SNB - 06/06/11. 3) Dr. Gonzalez - Arimidex x 5 years. No chemo. 4) Dr. Quintero (+) RT.  ANITA CEVALLOS is a 61 year old female patient who presents today in follow up for history of  right breast cancer. Since her last visit, she has no new breast related complaints.  Imaging with a bilateral screening mammogram was done on 07/18/2022, which revealed no mammographic evidence of malignancy. BI-RADS Category 2:  Benign  INTERVAL HISTORY 7/31/23 Anita is here for her follow up visit  She has no breast related complaints at this time.  She denies any breast pain, has not palpated any new palpable masses in either breast and denies any nipple discharge or retraction.  Her imaging is as follows: 07/24/2023 b/l mammo -scattered areas of fibroglandular density. -stable mass seen in the right breast BIRADS2

## 2023-07-31 NOTE — ASSESSMENT
[FreeTextEntry1] : JUSTIN is a rubi 62 year old patient who presented today in follow up for a history of right breast cancer.  She has been doing well with no new breast related complaints.   Her imaging is as follows: 07/24/2023 b/l mammo -scattered areas of fibroglandular density. -stable mass seen in the right breast BIRADS2  Physical exam was unrevealing today.  Imaging with a bilateral screening mammogram will be due in July 25, 2024, and that will be scheduled today.  She will return for follow-up and clinical breast exam in July 2024. She will continue follow-up with medical oncology as scheduled.   JUSTIN CEVALLOS has been enrolled in the breast cancer surgical survivorship care program. A copy of her survivorship care plan has been provided to her as well.   I spent a total of 20 minutes of face to face time with this patient, greater than 50% of which was spent in counseling and/or coordination of care. All of her questions were appropriately answered. She knows to call with any concerns.

## 2023-07-31 NOTE — PHYSICAL EXAM
[Normocephalic] : normocephalic [Atraumatic] : atraumatic [EOMI] : extra ocular movement intact [Examined in the supine and seated position] : examined in the supine and seated position [Symmetrical] : symmetrical [No dominant masses] : no dominant masses in right breast  [No dominant masses] : no dominant masses left breast [No Nipple Retraction] : no left nipple retraction [No Nipple Discharge] : no left nipple discharge [No Axillary Lymphadenopathy] : no left axillary lymphadenopathy [No Edema] : no edema [No Rashes] : no rashes [No Ulceration] : no ulceration [de-identified] : On physical exam, there are no discrete masses in either breast or axilla. There is no nipple discharge or inversion bilaterally. There are no skin changes bilaterally.

## 2024-08-05 ENCOUNTER — OUTPATIENT (OUTPATIENT)
Dept: OUTPATIENT SERVICES | Facility: HOSPITAL | Age: 64
LOS: 1 days | End: 2024-08-05
Payer: MEDICARE

## 2024-08-05 ENCOUNTER — RESULT REVIEW (OUTPATIENT)
Age: 64
End: 2024-08-05

## 2024-08-05 DIAGNOSIS — Z12.31 ENCOUNTER FOR SCREENING MAMMOGRAM FOR MALIGNANT NEOPLASM OF BREAST: ICD-10-CM

## 2024-08-05 PROCEDURE — 77063 BREAST TOMOSYNTHESIS BI: CPT

## 2024-08-05 PROCEDURE — 77063 BREAST TOMOSYNTHESIS BI: CPT | Mod: 26

## 2024-08-05 PROCEDURE — 77067 SCR MAMMO BI INCL CAD: CPT | Mod: 26

## 2024-08-05 PROCEDURE — 77067 SCR MAMMO BI INCL CAD: CPT

## 2024-08-06 DIAGNOSIS — Z12.31 ENCOUNTER FOR SCREENING MAMMOGRAM FOR MALIGNANT NEOPLASM OF BREAST: ICD-10-CM

## 2024-08-22 ENCOUNTER — APPOINTMENT (OUTPATIENT)
Dept: BREAST CENTER | Facility: CLINIC | Age: 64
End: 2024-08-22
Payer: MEDICARE

## 2024-08-22 DIAGNOSIS — C50.919 MALIGNANT NEOPLASM OF UNSPECIFIED SITE OF UNSPECIFIED FEMALE BREAST: ICD-10-CM

## 2024-08-22 PROCEDURE — 99212 OFFICE O/P EST SF 10 MIN: CPT

## 2024-08-22 NOTE — HISTORY OF PRESENT ILLNESS
[FreeTextEntry1] : Problem List: 1) Stage IA Right breast cancer. ER/[place] (+). HER2 (-). (March 2011) 2) s/p Right NLOC - 04/20/11. s/p Right SNB - 06/06/11. 3) Dr. Gonzalez - Arimidex x 5 years. No chemo. 4) Dr. Quintero (+) RT.  ANITA CEVALLOS is a 61 year old female patient who presents today in follow up for history of  right breast cancer. Since her last visit, she has no new breast related complaints.  Imaging with a bilateral screening mammogram was done on 07/18/2022, which revealed no mammographic evidence of malignancy. BI-RADS Category 2:  Benign  INTERVAL HISTORY 7/31/23 Anita is here for her follow up visit  She has no breast related complaints at this time.  She denies any breast pain, has not palpated any new palpable masses in either breast and denies any nipple discharge or retraction.  Her imaging is as follows: 07/24/2023 b/l mammo -scattered areas of fibroglandular density. -stable mass seen in the right breast BIRADS2  INTERVAL HISTORY 8/22/24 Anita is here for her follow up visit  She has no breast related complaints at this time.  She denies any breast pain, has not palpated any new palpable masses in either breast and denies any nipple discharge or retraction.  Her imaging is as follows: 08/05/2024 b/l mammo-->birads2 scattered areas of fibroglandular density.  stable masses seen in both breasts.

## 2024-08-22 NOTE — HISTORY OF PRESENT ILLNESS
[FreeTextEntry1] : Problem List: 1) Stage IA Right breast cancer. ER/[place] (+). HER2 (-). (March 2011) 2) s/p Right NLOC - 04/20/11. s/p Right SNB - 06/06/11. 3) Dr. Gonzalez - Arimidex x 5 years. No chemo. 4) Dr. Quintero (+) RT.  ANITA CEVALLOS is a 61 year old female patient who presents today in follow up for history of  right breast cancer. Since her last visit, she has no new breast related complaints.  Imaging with a bilateral screening mammogram was done on 07/18/2022, which revealed no mammographic evidence of malignancy. BI-RADS Category 2:  Benign  INTERVAL HISTORY 7/31/23 Anita is here for her follow up visit  She has no breast related complaints at this time.  She denies any breast pain, has not palpated any new palpable masses in either breast and denies any nipple discharge or retraction.  Her imaging is as follows: 07/24/2023 b/l mammo -scattered areas of fibroglandular density. -stable mass seen in the right breast BIRADS2  INTERVAL HISTORY 8/22/24 Anita is here for her follow up visit  She has no breast related complaints at this time.  She denies any breast pain, has not palpated any new palpable masses in either breast and denies any nipple discharge or retraction.  Her imaging is as follows: 08/05/2024 b/l mammo-->birads2 scattered areas of fibroglandular density.  stable masses seen in both breasts.

## 2024-08-22 NOTE — DATA REVIEWED
[FreeTextEntry1] : [id number]     EXAM:  MG MAMMO SCREEN W ELSIE BI#    08/05/2024 INTERPRETATION:  HISTORY: Bilateral MG MAMMO SCREEN W ELSIE BI# was performed. Patient is 63 years old and is seen for screening.  The patient has a history of right lumpectomy in 2012 - malignant. The patient has the following family history of breast cancer:  maternal aunt, at age 55, breast cancer.  RISK ASSESSMENT: Tyrer-Cuzick Lifetime Risk: 5.5  CLINICAL BREAST EXAM: The patient reports their last clinical breast exam was performed over one year ago.  COMPARISON STUDIES: The present examination has been compared to prior imaging studies performed at [facility] on 04/29/2019, 07/15/2020, 07/19/2021, 07/18/2022 and 07/24/2023.  MAMMOGRAM FINDINGS: Mammography was performed including the following views: bilateral craniocaudal with tomosynthesis, bilateral mediolateral oblique with tomosynthesis.  The examination includes digital synthetic 2D and digital tomosynthesis 3D images. Additional imaging analysis was performed using CAD (computer-aided detection) software.  There are scattered areas of fibroglandular density.  There are stable masses seen in both breasts.  No suspicious mass, grouping of calcifications, or other abnormality is identified.  IMPRESSION: There is no mammographic evidence of malignancy.  RECOMMENDATION: Unless otherwise indicated by clinical findings, annual screening mammography recommended.  ASSESSMENT: BI-RADS Category 2:  Benign

## 2024-08-22 NOTE — ASSESSMENT
[FreeTextEntry1] : JUSTIN is a rubi 64 year old patient who presented today in follow up for a history of right breast cancer.  She has been doing well with no new breast related complaints.   Her imaging is as follows: 08/05/2024 b/l mammo-->birads2 scattered areas of fibroglandular density.  stable masses seen in both breasts.   Physical exam was unrevealing today.  Imaging with a bilateral screening mammogram will be due in 8/6/25 and that will be scheduled today.  She will return for follow-up and clinical breast exam in 8/2025. She will continue follow-up with medical oncology as scheduled.    I spent a total of 15 minutes of face to face time with this patient, greater than 50% of which was spent in counseling and/or coordination of care. All of her questions were appropriately answered. She knows to call with any concerns.

## 2024-08-22 NOTE — DATA REVIEWED
[FreeTextEntry1] : [id number]     EXAM:  MG MAMMO SCREEN W ELSIE BI#    08/05/2024 INTERPRETATION:  HISTORY: Bilateral MG MAMMO SCREEN W ELSIE BI# was performed. Patient is 63 years old and is seen for screening.  The patient has a history of right lumpectomy in 2012 - malignant. The patient has the following family history of breast cancer:  maternal aunt, at age 55, breast cancer.  RISK ASSESSMENT: Tyrer-Cuzick Lifetime Risk: 5.5  CLINICAL BREAST EXAM: The patient reports their last clinical breast exam was performed over one year ago.  COMPARISON STUDIES: The present examination has been compared to prior imaging studies performed at [facility] on 04/29/2019, 07/15/2020, 07/19/2021, 07/18/2022 and 07/24/2023.  MAMMOGRAM FINDINGS: Mammography was performed including the following views: bilateral craniocaudal with tomosynthesis, bilateral mediolateral oblique with tomosynthesis.  The examination includes digital synthetic 2D and digital tomosynthesis 3D images. Additional imaging analysis was performed using CAD (computer-aided detection) software.  There are scattered areas of fibroglandular density.  There are stable masses seen in both breasts.  No suspicious mass, grouping of calcifications, or other abnormality is identified.  IMPRESSION: There is no mammographic evidence of malignancy.  RECOMMENDATION: Unless otherwise indicated by clinical findings, annual screening mammography recommended.  ASSESSMENT: BI-RADS Category 2:  Benign

## 2025-08-11 ENCOUNTER — RESULT REVIEW (OUTPATIENT)
Age: 65
End: 2025-08-11

## 2025-08-11 ENCOUNTER — OUTPATIENT (OUTPATIENT)
Dept: OUTPATIENT SERVICES | Facility: HOSPITAL | Age: 65
LOS: 1 days | End: 2025-08-11
Payer: MEDICARE

## 2025-08-11 DIAGNOSIS — Z12.31 ENCOUNTER FOR SCREENING MAMMOGRAM FOR MALIGNANT NEOPLASM OF BREAST: ICD-10-CM

## 2025-08-11 DIAGNOSIS — C50.919 MALIGNANT NEOPLASM OF UNSPECIFIED SITE OF UNSPECIFIED FEMALE BREAST: ICD-10-CM

## 2025-08-11 PROCEDURE — 77063 BREAST TOMOSYNTHESIS BI: CPT

## 2025-08-11 PROCEDURE — 77067 SCR MAMMO BI INCL CAD: CPT

## 2025-08-11 PROCEDURE — 77063 BREAST TOMOSYNTHESIS BI: CPT | Mod: 26

## 2025-08-11 PROCEDURE — 77067 SCR MAMMO BI INCL CAD: CPT | Mod: 26

## 2025-08-12 DIAGNOSIS — Z12.31 ENCOUNTER FOR SCREENING MAMMOGRAM FOR MALIGNANT NEOPLASM OF BREAST: ICD-10-CM

## 2025-08-25 ENCOUNTER — NON-APPOINTMENT (OUTPATIENT)
Age: 65
End: 2025-08-25

## 2025-08-25 ENCOUNTER — APPOINTMENT (OUTPATIENT)
Dept: BREAST CENTER | Facility: CLINIC | Age: 65
End: 2025-08-25
Payer: MEDICARE

## 2025-08-25 VITALS
HEIGHT: 59 IN | DIASTOLIC BLOOD PRESSURE: 75 MMHG | BODY MASS INDEX: 40.72 KG/M2 | SYSTOLIC BLOOD PRESSURE: 177 MMHG | WEIGHT: 202 LBS | HEART RATE: 51 BPM

## 2025-08-25 DIAGNOSIS — C50.919 MALIGNANT NEOPLASM OF UNSPECIFIED SITE OF UNSPECIFIED FEMALE BREAST: ICD-10-CM

## 2025-08-25 DIAGNOSIS — C50.911 MALIGNANT NEOPLASM OF UNSPECIFIED SITE OF RIGHT FEMALE BREAST: ICD-10-CM

## 2025-08-25 PROCEDURE — 99213 OFFICE O/P EST LOW 20 MIN: CPT
